# Patient Record
Sex: FEMALE | URBAN - METROPOLITAN AREA
[De-identification: names, ages, dates, MRNs, and addresses within clinical notes are randomized per-mention and may not be internally consistent; named-entity substitution may affect disease eponyms.]

---

## 2020-10-05 ENCOUNTER — APPOINTMENT (RX ONLY)
Dept: URBAN - METROPOLITAN AREA CLINIC 91 | Facility: CLINIC | Age: 50
Setting detail: DERMATOLOGY
End: 2020-10-05

## 2020-10-05 DIAGNOSIS — Z71.89 OTHER SPECIFIED COUNSELING: ICD-10-CM

## 2020-10-05 DIAGNOSIS — L57.8 OTHER SKIN CHANGES DUE TO CHRONIC EXPOSURE TO NONIONIZING RADIATION: ICD-10-CM

## 2020-10-05 PROBLEM — D23.71 OTHER BENIGN NEOPLASM OF SKIN OF RIGHT LOWER LIMB, INCLUDING HIP: Status: ACTIVE | Noted: 2020-10-05

## 2020-10-05 PROBLEM — D23.122 OTHER BENIGN NEOPLASM OF SKIN OF LEFT LOWER EYELID, INCLUDING CANTHUS: Status: ACTIVE | Noted: 2020-10-05

## 2020-10-05 PROBLEM — D23.112 OTHER BENIGN NEOPLASM OF SKIN OF RIGHT LOWER EYELID, INCLUDING CANTHUS: Status: ACTIVE | Noted: 2020-10-05

## 2020-10-05 PROCEDURE — 99202 OFFICE O/P NEW SF 15 MIN: CPT

## 2020-10-05 PROCEDURE — ? COUNSELING

## 2020-10-05 PROCEDURE — ? PRESCRIPTION

## 2020-10-05 RX ORDER — TRETINOIN 0.6 MG/G
GEL TOPICAL
Qty: 1 | Refills: 3 | Status: ERX | COMMUNITY
Start: 2020-10-05

## 2020-10-05 RX ORDER — TRETINOIN 0.5 MG/G
LOTION TOPICAL QHS
Qty: 1 | Refills: 0 | Status: CANCELLED
Stop reason: CLARIF

## 2020-10-05 RX ADMIN — TRETINOIN: 0.6 GEL TOPICAL at 00:00

## 2020-10-05 ASSESSMENT — LOCATION DETAILED DESCRIPTION DERM: LOCATION DETAILED: LEFT INFERIOR CENTRAL MALAR CHEEK

## 2020-10-05 ASSESSMENT — LOCATION ZONE DERM: LOCATION ZONE: FACE

## 2020-10-05 ASSESSMENT — LOCATION SIMPLE DESCRIPTION DERM: LOCATION SIMPLE: LEFT CHEEK

## 2020-10-05 NOTE — PROCEDURE: MIPS QUALITY
Detail Level: Detailed
Quality 110: Preventive Care And Screening: Influenza Immunization: Influenza immunization was not ordered or administered, reason not given
Additional Notes: SPG OBSERVED

## 2022-07-11 ENCOUNTER — HOSPITAL ENCOUNTER (OUTPATIENT)
Dept: HOSPITAL 92 - CSHSDC | Age: 52
Discharge: HOME | End: 2022-07-11
Attending: INTERNAL MEDICINE
Payer: COMMERCIAL

## 2022-07-11 VITALS — BODY MASS INDEX: 25.7 KG/M2

## 2022-07-11 DIAGNOSIS — Z12.11: Primary | ICD-10-CM

## 2022-07-11 DIAGNOSIS — K21.9: ICD-10-CM

## 2022-07-11 DIAGNOSIS — K29.50: ICD-10-CM

## 2022-07-11 DIAGNOSIS — Z79.899: ICD-10-CM

## 2022-07-11 DIAGNOSIS — Z20.822: ICD-10-CM

## 2022-07-11 DIAGNOSIS — G43.909: ICD-10-CM

## 2022-07-11 DIAGNOSIS — Z79.4: ICD-10-CM

## 2022-07-11 DIAGNOSIS — K31.7: ICD-10-CM

## 2022-07-11 DIAGNOSIS — Z88.5: ICD-10-CM

## 2022-07-11 DIAGNOSIS — D12.3: ICD-10-CM

## 2022-07-11 DIAGNOSIS — Z79.84: ICD-10-CM

## 2022-07-11 DIAGNOSIS — E11.43: ICD-10-CM

## 2022-07-11 DIAGNOSIS — K64.9: ICD-10-CM

## 2022-07-11 DIAGNOSIS — I10: ICD-10-CM

## 2022-07-11 DIAGNOSIS — K57.30: ICD-10-CM

## 2022-07-11 DIAGNOSIS — E03.9: ICD-10-CM

## 2022-07-11 DIAGNOSIS — K31.84: ICD-10-CM

## 2022-07-11 DIAGNOSIS — K44.9: ICD-10-CM

## 2022-07-11 DIAGNOSIS — G89.4: ICD-10-CM

## 2022-07-11 PROCEDURE — 0DBL8ZZ EXCISION OF TRANSVERSE COLON, VIA NATURAL OR ARTIFICIAL OPENING ENDOSCOPIC: ICD-10-PCS | Performed by: INTERNAL MEDICINE

## 2022-07-11 PROCEDURE — 0DB98ZX EXCISION OF DUODENUM, VIA NATURAL OR ARTIFICIAL OPENING ENDOSCOPIC, DIAGNOSTIC: ICD-10-PCS | Performed by: INTERNAL MEDICINE

## 2022-07-11 PROCEDURE — 0DB68ZZ EXCISION OF STOMACH, VIA NATURAL OR ARTIFICIAL OPENING ENDOSCOPIC: ICD-10-PCS | Performed by: INTERNAL MEDICINE

## 2022-07-11 PROCEDURE — 0DB68ZX EXCISION OF STOMACH, VIA NATURAL OR ARTIFICIAL OPENING ENDOSCOPIC, DIAGNOSTIC: ICD-10-PCS | Performed by: INTERNAL MEDICINE

## 2022-07-11 PROCEDURE — 88305 TISSUE EXAM BY PATHOLOGIST: CPT
